# Patient Record
Sex: FEMALE | Race: WHITE | URBAN - METROPOLITAN AREA
[De-identification: names, ages, dates, MRNs, and addresses within clinical notes are randomized per-mention and may not be internally consistent; named-entity substitution may affect disease eponyms.]

---

## 2023-01-19 ENCOUNTER — MEDICAL CORRESPONDENCE (OUTPATIENT)
Dept: HEALTH INFORMATION MANAGEMENT | Facility: CLINIC | Age: 18
End: 2023-01-19

## 2023-02-21 ENCOUNTER — TELEPHONE (OUTPATIENT)
Dept: PEDIATRIC CARDIOLOGY | Facility: CLINIC | Age: 18
End: 2023-02-21
Payer: COMMERCIAL

## 2023-02-21 DIAGNOSIS — I45.6 WPW (WOLFF-PARKINSON-WHITE SYNDROME): Primary | ICD-10-CM

## 2023-02-21 NOTE — TELEPHONE ENCOUNTER
Called number provided below - left generic vm on unidentified phone for callback.     No EKG orders are found in chart and in faxes. Can send us a new one    Wolfgang Low RN on 2/21/2023 at 2:38 PM    -------------------------------  Previous Messages:    ----- Message from Chuyita Mendenhall sent at 2/21/2023 10:41 AM CST -----  Regarding: EKG order  Warren from [facility] is looking to help patient get scheduled for an EKG. Warren stated an EKG order was sent last Thursday. I was unable to see an EKG order on patient's chart, however, I may not know where to look for that information.     Please call Warren from [facility] to confirm we have the EKG order and direct him to the appointment line.  [phone]

## 2023-03-02 NOTE — TELEPHONE ENCOUNTER
Received another call from CHARLIE Kelley with [facility]. Warren states he has been trying to fax the EKG orders and hasn't had any luck. [facility] provided Peds Cardiology Fax number [phone] for him to try again. Will keep an eye out for fax today. Warren wants to be able to get Renetta in for EKG, and have results sent while she is up here for school.     Wolfgang Low RN on 3/2/2023 at 11:43 AM

## 2023-03-10 ENCOUNTER — ALLIED HEALTH/NURSE VISIT (OUTPATIENT)
Dept: PEDIATRICS | Facility: CLINIC | Age: 18
End: 2023-03-10
Attending: PEDIATRICS
Payer: COMMERCIAL

## 2023-03-10 DIAGNOSIS — I45.6 WPW (WOLFF-PARKINSON-WHITE SYNDROME): Primary | ICD-10-CM

## 2023-03-10 LAB
ATRIAL RATE - MUSE: 69 BPM
DIASTOLIC BLOOD PRESSURE - MUSE: NORMAL MMHG
INTERPRETATION ECG - MUSE: NORMAL
P AXIS - MUSE: 54 DEGREES
PR INTERVAL - MUSE: 114 MS
QRS DURATION - MUSE: 90 MS
QT - MUSE: 404 MS
QTC - MUSE: 433 MS
R AXIS - MUSE: 75 DEGREES
SYSTOLIC BLOOD PRESSURE - MUSE: NORMAL MMHG
T AXIS - MUSE: 77 DEGREES
VENTRICULAR RATE- MUSE: 69 BPM

## 2023-06-02 ENCOUNTER — HEALTH MAINTENANCE LETTER (OUTPATIENT)
Age: 18
End: 2023-06-02

## 2024-06-22 ENCOUNTER — HEALTH MAINTENANCE LETTER (OUTPATIENT)
Age: 19
End: 2024-06-22

## 2025-07-12 ENCOUNTER — HEALTH MAINTENANCE LETTER (OUTPATIENT)
Age: 20
End: 2025-07-12